# Patient Record
Sex: FEMALE | Race: AMERICAN INDIAN OR ALASKA NATIVE | NOT HISPANIC OR LATINO | Employment: FULL TIME | URBAN - METROPOLITAN AREA
[De-identification: names, ages, dates, MRNs, and addresses within clinical notes are randomized per-mention and may not be internally consistent; named-entity substitution may affect disease eponyms.]

---

## 2019-03-08 ENCOUNTER — TELEPHONE (OUTPATIENT)
Dept: HEALTH INFORMATION MANAGEMENT | Facility: OTHER | Age: 54
End: 2019-03-08

## 2019-03-10 ENCOUNTER — OFFICE VISIT (OUTPATIENT)
Dept: URGENT CARE | Facility: PHYSICIAN GROUP | Age: 54
End: 2019-03-10
Payer: COMMERCIAL

## 2019-03-10 VITALS
HEIGHT: 63 IN | WEIGHT: 193 LBS | RESPIRATION RATE: 16 BRPM | BODY MASS INDEX: 34.2 KG/M2 | TEMPERATURE: 98.4 F | DIASTOLIC BLOOD PRESSURE: 88 MMHG | HEART RATE: 86 BPM | SYSTOLIC BLOOD PRESSURE: 134 MMHG | OXYGEN SATURATION: 98 %

## 2019-03-10 DIAGNOSIS — L24.9 IRRITANT CONTACT DERMATITIS, UNSPECIFIED TRIGGER: ICD-10-CM

## 2019-03-10 PROCEDURE — 99214 OFFICE O/P EST MOD 30 MIN: CPT | Performed by: PHYSICIAN ASSISTANT

## 2019-03-10 RX ORDER — TRIAMCINOLONE ACETONIDE 1 MG/G
OINTMENT TOPICAL
Qty: 30 G | Refills: 0 | Status: SHIPPED | OUTPATIENT
Start: 2019-03-10 | End: 2020-07-31

## 2019-03-13 ASSESSMENT — ENCOUNTER SYMPTOMS
NEUROLOGICAL NEGATIVE: 1
EYES NEGATIVE: 1
CONSTITUTIONAL NEGATIVE: 1
GASTROINTESTINAL NEGATIVE: 1

## 2019-03-13 NOTE — PROGRESS NOTES
Subjective:      Britany Marsh is a 53 y.o. female who presents with Rash (Dry cracked skin under nose ongoing several weeks.)        Rash     Patient presents today for approx 3 weeks of irritation around nose and eyes.  Patient states she has hx of very dry skin. She started getting a cracked area around her left nare and went to [facility] where she was dx with shingles.  She states she ended up not picking up the Valtrex but did  some Gabapentin which she took for the stinging area by her nose with mild relief.  She notes she had been using Aquaphor with modest relief of the redness and then also got a silver based cream that seemed to make her rash react poorly and now is more irritated an inflamed.  It is slightly itchy and has not noticed any crusting or drainage.  No fevers.  No hx of eczema.  Skin seems to get much worse after the silver based cream.      Review of Systems   Constitutional: Negative.    HENT: Negative.    Eyes: Negative.    Gastrointestinal: Negative.    Skin: Positive for itching and rash.   Neurological: Negative.    Endo/Heme/Allergies: Negative.        PMH:  has a past medical history of Hypertension.  MEDS:   Current Outpatient Prescriptions:   •  triamcinolone acetonide (KENALOG) 0.1 % Ointment, Apply thin layer to affected areas BID x 7 days, Disp: 30 g, Rfl: 0  •  atenolol (TENORMIN) 25 MG TABS, Take 25 mg by mouth as needed., Disp: , Rfl:   •  Cholecalciferol 2000 UNIT CAPS, Take 2,000 Units by mouth every day., Disp: , Rfl:   •  GLUCOSAMINE HCL PO, Take 1 Tab by mouth every day., Disp: , Rfl:   •  simvastatin (ZOCOR) 40 MG TABS, Take 40 mg by mouth every day., Disp: , Rfl:   •  aspirin 81 MG tablet, Take 81 mg by mouth every day., Disp: , Rfl:   •  FISH OIL, Take 1 Cap by mouth every day., Disp: , Rfl:   •  LOVASTATIN PO, Take 1 Tab by mouth every day., Disp: , Rfl:   ALLERGIES:   Allergies   Allergen Reactions   • Pcn [Penicillins]      SURGHX: No past surgical  "history on file.  SOCHX:  reports that she has been smoking Cigarettes.  She does not have any smokeless tobacco history on file. She reports that she drinks alcohol. She reports that she does not use drugs.  FH: Family history was reviewed, no pertinent findings to report   Objective:     /88   Pulse 86   Temp 36.9 °C (98.4 °F) (Temporal)   Resp 16   Ht 1.6 m (5' 3\")   Wt 87.5 kg (193 lb)   SpO2 98%   BMI 34.19 kg/m²      Physical Exam   Constitutional: She is oriented to person, place, and time. She appears well-developed and well-nourished. No distress.   HENT:   Head: Normocephalic.       Right Ear: External ear normal.   Left Ear: External ear normal.   Mouth/Throat: Oropharynx is clear and moist. No oropharyngeal exudate.   Eyes: Pupils are equal, round, and reactive to light. Conjunctivae and EOM are normal. Right eye exhibits no discharge. Left eye exhibits no discharge.   Neck: Normal range of motion. Neck supple.   Cardiovascular: Normal rate and regular rhythm.    Pulmonary/Chest: Effort normal and breath sounds normal.   Musculoskeletal: Normal range of motion.   Lymphadenopathy:     She has no cervical adenopathy.   Neurological: She is alert and oriented to person, place, and time.   Vitals reviewed.          Assessment/Plan:     1. Irritant contact dermatitis, unspecified trigger  triamcinolone acetonide (KENALOG) 0.1 % Ointment    REFERRAL TO DERMATOLOGY       -possible irritant dermatitis now with application of silver based cream as this acutely worsened this symptoms per patient.    -will place on topical steroid ointment to be applied in thin layer, carefully and up to a 1 week.  She may continue to moisturize.  Discussed Aquaphor does have lanolin however she does not report a known wool allergy.   -Derm follow up referral placed      Supportive care, differential diagnoses, and indications for immediate follow-up discussed with patient.   Pathogenesis of diagnosis discussed " including typical length and natural progression.   Instructed to return to clinic or nearest emergency department for any change in condition, further concerns, or worsening of symptoms.  Patient states understanding of the plan of care and discharge instructions.    Louisa Peres P.A.-C.

## 2019-04-11 ENCOUNTER — OFFICE VISIT (OUTPATIENT)
Dept: INTERNAL MEDICINE | Facility: MEDICAL CENTER | Age: 54
End: 2019-04-11
Payer: COMMERCIAL

## 2019-04-11 VITALS
TEMPERATURE: 98.1 F | BODY MASS INDEX: 34.4 KG/M2 | HEART RATE: 84 BPM | SYSTOLIC BLOOD PRESSURE: 122 MMHG | WEIGHT: 194.13 LBS | DIASTOLIC BLOOD PRESSURE: 80 MMHG | HEIGHT: 63 IN | OXYGEN SATURATION: 93 %

## 2019-04-11 DIAGNOSIS — Z88.9 MULTIPLE ALLERGIES: ICD-10-CM

## 2019-04-11 DIAGNOSIS — Z12.39 BREAST CANCER SCREENING: ICD-10-CM

## 2019-04-11 DIAGNOSIS — R21 RASH: ICD-10-CM

## 2019-04-11 DIAGNOSIS — E66.9 OBESITY (BMI 30-39.9): ICD-10-CM

## 2019-04-11 DIAGNOSIS — Z12.11 COLON CANCER SCREENING: ICD-10-CM

## 2019-04-11 DIAGNOSIS — E78.5 DYSLIPIDEMIA: ICD-10-CM

## 2019-04-11 PROCEDURE — 99204 OFFICE O/P NEW MOD 45 MIN: CPT | Mod: GC | Performed by: INTERNAL MEDICINE

## 2019-04-11 RX ORDER — MINERAL OIL/HYDROPHIL PETROLAT
OINTMENT (GRAM) TOPICAL 2 TIMES DAILY
COMMUNITY
End: 2020-04-30

## 2019-04-11 RX ORDER — DOXYCYCLINE HYCLATE 100 MG
100 TABLET ORAL 2 TIMES DAILY
Qty: 28 TAB | Refills: 0 | Status: SHIPPED | OUTPATIENT
Start: 2019-04-11 | End: 2020-04-30

## 2019-04-11 ASSESSMENT — PATIENT HEALTH QUESTIONNAIRE - PHQ9: CLINICAL INTERPRETATION OF PHQ2 SCORE: 0

## 2019-04-11 NOTE — PROGRESS NOTES
New Patient to Establish    Reason to establish: New patient to establish    CC:   Chief Complaint   Patient presents with   • New Patient   • Rash     Pt had a rash in front of face under nose area. Follow up   • Eye Problem     Pt's eyes gets dried corner of eyes and gets bumps   • Skin Discoloration     [place] under right breast. Pt noticed [id number]     HPI:   The patient is a 53-year-old female with past medical history of hypertension, dyslipidemia presented to the clinic to establish primary care and with complains of Rash since the past three weeks.  -She stated she has noticed two circular rashes located on the right side of her chest that is not painful, not progressively increasing in size.  Never had similar rashes in the past.  -No recent travel history or sick contacts.  - She has recently taken her dog to vet who has removed a tick.   - she admits to smoking cigarettes intermittently over the past 40 years, denies illicit drug use.  Drinks alcohol occasionally.  -She had a rash under the nose a month ago, went to urgent care was prescribed steroid ointment and symptoms resolved.    Patient Active Problem List    Diagnosis Date Noted   • Obesity (BMI 30-39.9) 04/11/2019       Past Medical History:   Diagnosis Date   • Hypertension      Current Outpatient Prescriptions   Medication Sig Dispense Refill   • hydrophilic ointment (AQUAPHOR) Ointment Apply  to affected area(s) 2 Times a Day.     • Multiple Vitamins-Minerals (MULTIVITAMIN PO) Take 1 Tab by mouth every day.     • Biotin 5000 MCG Cap Take 1 Cap by mouth every day.     • NON SPECIFIED Instaflex. Order a sample. Pt started on 04/01/19     • doxycycline (VIBRAMYCIN) 100 MG Tab Take 1 Tab by mouth 2 times a day. 28 Tab 0   • triamcinolone acetonide (KENALOG) 0.1 % Ointment Apply thin layer to affected areas BID x 7 days 30 g 0   • simvastatin (ZOCOR) 40 MG TABS Take 40 mg by mouth every day.     • aspirin 81 MG tablet Take 81 mg by mouth  every day.     • FISH OIL Take 1,000 mg by mouth every day.     • atenolol (TENORMIN) 25 MG TABS Take 25 mg by mouth as needed.     • Cholecalciferol 2000 UNIT CAPS Take 2,000 Units by mouth every day.     • GLUCOSAMINE HCL PO Take 1 Tab by mouth every day.     • LOVASTATIN PO Take 1 Tab by mouth every day.       No current facility-administered medications for this visit.      Allergies as of 04/11/2019 - Reviewed 04/11/2019   Allergen Reaction Noted   • Pcn [penicillins]  02/04/2014     Social History     Social History   • Marital status: Single     Spouse name: N/A   • Number of children: N/A   • Years of education: N/A     Occupational History   • Not on file.     Social History Main Topics   • Smoking status: Current Some Day Smoker     Years: 40.00     Types: Cigarettes   • Smokeless tobacco: Never Used      Comment: a Pack last 2 months   • Alcohol use Yes      Comment: occ / weekends   • Drug use: No   • Sexual activity: Not on file     Other Topics Concern   • Not on file     Social History Narrative   • No narrative on file     Family History   Problem Relation Age of Onset   • Colon Cancer Mother    • Colon Cancer Father         at age 70's   • Heart Disease Father    • Cancer Sister         Thyroid cancer at age 60's   • Diabetes Brother    • Breast Cancer Sister         s/p Mastectomy at age 60's    • No Known Problems Brother      History reviewed. No pertinent surgical history.    ROS: As per HPI. Additional pertinent systems as noted below.  Constitutional: Negative for chills and fever.   HENT: Negative for ear pain and sore throat.    Eyes: Negative for discharge and redness.   Respiratory: Negative for cough, hemoptysis, wheezing and stridor.    Cardiovascular: Negative for chest pain, palpitations and leg swelling.   Gastrointestinal: Negative for abdominal pain, constipation, diarrhea, heartburn, nausea and vomiting.   Genitourinary: Negative for dysuria, flank pain and hematuria.  "  Musculoskeletal: Negative for falls and myalgias.   Skin: Negative for itching and rash.   Neurological: Negative for dizziness, seizures, loss of consciousness and headaches.   Endo/Heme/Allergies: Negative for polydipsia. Does not bruise/bleed easily.   Psychiatric/Behavioral: Negative for substance abuse and suicidal ideas.     /80 (BP Location: Left arm, Patient Position: Sitting, BP Cuff Size: Adult)   Pulse 84   Temp 36.7 °C (98.1 °F) (Temporal)   Ht 1.594 m (5' 2.75\")   Wt 88.1 kg (194 lb 2 oz)   SpO2 93%   BMI 34.66 kg/m²     Physical Exam  General:  Alert and oriented, No apparent distress.    Eyes: Pupils equal and reactive. No scleral icterus.    Throat: Clear no erythema or exudates noted.    Neck: Supple. No lymphadenopathy noted. Thyroid not enlarged.    Lungs: Clear to auscultation and percussion bilaterally.    Cardiovascular: Regular rate and rhythm. No murmurs, rubs or gallops.    Abdomen:  Benign. No rebound or guarding noted.    Extremities: No clubbing, cyanosis, edema.    Skin: there are two distinct clearly demarcated central clearing rash- 3 cm size located in the right upper quadrant of the abdomen , one small 2 cm rash under the right breast that is non tender with central clearing.     Note: I have reviewed all pertinent labs and diagnostic tests associated with this visit with specific comments listed under the assessment and plan below    Assessment and Plan    1. Dyslipidemia  - No recent lipid panel on file.  - Currently on simvastatin daily for primary prevention.  -Ordered lipid panel.    2. Obesity (BMI 30-39.9)  Body mass index is 34.66 kg/m².  -Patient is identified to have weight management issues patient is currently on dietary modifications to lose weight and has lost more than 10 pounds since the past couple of months.  Patient is encouraged regarding weight loss and exercise.    3. Multiple allergies  -History of multiple allergies, specific allergy to " shellfish.  -Patient is advised to use over-the-counter antihistamines as needed for allergies.    4. Rash  -Patient reports she noticed a rash since the past 3 weeks which is not increasing in size.  - on exam there are two distinct circular rashes with central clearing and reddish margins located over the right breast in the lower quadrant and one over the right upper quadrant of abdomen.  -High likely patient has Lyme rash erythema migrans.  -Prescribed doxycycline 100 mg twice daily for 14 days.    5. Breast cancer screening  -Mammogram is ordered for breast cancer screening last mammogram was in 2015 which showed fiber dense breast.    6. Colon cancer screening  -Patient is referred to GI for colonoscopy      Followup: Return in about 3 months (around 7/11/2019).      Signed by: Zuly Langley M.D.

## 2019-04-11 NOTE — PATIENT INSTRUCTIONS
Lyme Disease  Lyme disease is an infection that affects many parts of the body, including the skin, joints, and nervous system. It is a bacterial infection that starts from the bite of an infected tick. The infection can spread, and some of the symptoms are similar to the flu. If Lyme disease is not treated, it may cause joint pain, swelling, numbness, problems thinking, fatigue, muscle weakness, and other problems.  What are the causes?  This condition is caused by bacteria called Borrelia burgdorferi.  You can get Lyme disease by being bitten by an infected tick. The tick must be attached to your skin to pass along the infection. [place] often carry infected ticks.  What increases the risk?  The following factors may make you more likely to develop this condition:  · Living in or visiting these areas in the [place]  ¨ [place].  ¨ [place] [facility] states.  ¨ [place].  · Spending time in wooded or grassy areas.  · Being outdoors with exposed skin.  · Camping, gardening, hiking, fishing, or hunting outdoors.  · Failing to remove a tick from your skin within 3-4 days.  What are the signs or symptoms?  Symptoms of this condition include:  · A round, red rash that surrounds the center of the tick bite. This is the first sign of infection. The center of the rash may be blood colored or have tiny blisters.  · Fatigue.  · Headache.  · Chills and fever.  · General achiness.  · Joint pain, often in the knees.  · Muscle pain.  · Swollen lymph glands.  · Stiff neck.  How is this diagnosed?  This condition is diagnosed based on:  · Your symptoms and medical history.  · A physical exam.  · A blood test.  How is this treated?  The main treatment for this condition is antibiotic medicine, which is usually taken by mouth (orally). The length of treatment depends on how soon after a tick bite you begin taking the medicine. In some cases, treatment is necessary for several weeks. If the infection is severe, antibiotics may  need to be given through an IV tube that is inserted into one of your veins.  Follow these instructions at home:  · Take your antibiotic medicine as told by your health care provider. Do not stop taking the antibiotic even if you start to feel better.  · Ask your health care provider about taking a probiotic in between doses of your antibiotic to help avoid stomach upset or diarrhea.  · Check with your health care provider before supplementing your treatment. Many alternative therapies have not been proven and may be harmful to you.  · Keep all follow-up visits as told by your health care provider. This is important.  How is this prevented?  You can become reinfected if you get another tick bite from an infected tick. Take these steps to help prevent an infection:  · Cover your skin with light-colored clothing when you are outdoors in the spring and summer months.  · Spray clothing and skin with bug spray. The spray should be 20-30% DEET.  · Avoid wooded, grassy, and shaded areas.  · Remove yard litter, brush, trash, and plants that attract deer and rodents.  · Check yourself for ticks when you come indoors.  · Wash clothing worn each day.  · Check your pets for ticks before they come inside.  · If you find a tick:  ¨ Remove it with tweezers.  ¨ Clean your hands and the bite area with rubbing alcohol or soap and water.  Pregnant women should take special care to avoid tick bites because the infection can be passed along to the fetus.  Contact a health care provider if:  · You have symptoms after treatment.  · You have removed a tick and want to bring it to your health care provider for testing.  Get help right away if:  · You have an irregular heartbeat.  · You have nerve pain.  · Your face feels numb.  This information is not intended to replace advice given to you by your health care provider. Make sure you discuss any questions you have with your health care provider.  Document Released: 03/26/2002 Document  Revised: 08/08/2017 Document Reviewed: 08/08/2017  [employer] Interactive Patient Education © 2017 [employer]

## 2019-04-19 ENCOUNTER — APPOINTMENT (OUTPATIENT)
Dept: MEDICAL GROUP | Facility: PHYSICIAN GROUP | Age: 54
End: 2019-04-19
Payer: COMMERCIAL

## 2019-04-26 ENCOUNTER — HOSPITAL ENCOUNTER (OUTPATIENT)
Dept: RADIOLOGY | Facility: MEDICAL CENTER | Age: 54
End: 2019-04-26
Attending: STUDENT IN AN ORGANIZED HEALTH CARE EDUCATION/TRAINING PROGRAM
Payer: COMMERCIAL

## 2019-04-26 DIAGNOSIS — Z12.39 BREAST CANCER SCREENING: ICD-10-CM

## 2019-04-26 PROCEDURE — 77063 BREAST TOMOSYNTHESIS BI: CPT

## 2019-06-12 LAB
BASOPHILS # BLD AUTO: 0 X10E3/UL (ref 0–0.2)
BASOPHILS NFR BLD AUTO: 1 %
CHOLEST SERPL-MCNC: 173 MG/DL (ref 100–199)
EOSINOPHIL # BLD AUTO: 0.1 X10E3/UL (ref 0–0.4)
EOSINOPHIL NFR BLD AUTO: 3 %
ERYTHROCYTE [DISTWIDTH] IN BLOOD BY AUTOMATED COUNT: 14.6 % (ref 12.3–15.4)
HCT VFR BLD AUTO: 43.5 % (ref 34–46.6)
HDLC SERPL-MCNC: 67 MG/DL
HGB BLD-MCNC: 14.3 G/DL (ref 11.1–15.9)
IMM GRANULOCYTES # BLD AUTO: 0 X10E3/UL (ref 0–0.1)
IMM GRANULOCYTES NFR BLD AUTO: 0 %
IMMATURE CELLS  115398: NORMAL
LABORATORY COMMENT REPORT: NORMAL
LDLC SERPL CALC-MCNC: 77 MG/DL (ref 0–99)
LYMPHOCYTES # BLD AUTO: 1.7 X10E3/UL (ref 0.7–3.1)
LYMPHOCYTES NFR BLD AUTO: 41 %
MCH RBC QN AUTO: 30.3 PG (ref 26.6–33)
MCHC RBC AUTO-ENTMCNC: 32.9 G/DL (ref 31.5–35.7)
MCV RBC AUTO: 92 FL (ref 79–97)
MONOCYTES # BLD AUTO: 0.2 X10E3/UL (ref 0.1–0.9)
MONOCYTES NFR BLD AUTO: 4 %
MORPHOLOGY BLD-IMP: NORMAL
NEUTROPHILS # BLD AUTO: 2.1 X10E3/UL (ref 1.4–7)
NEUTROPHILS NFR BLD AUTO: 51 %
NRBC BLD AUTO-RTO: NORMAL %
PLATELET # BLD AUTO: 243 X10E3/UL (ref 150–450)
RBC # BLD AUTO: 4.72 X10E6/UL (ref 3.77–5.28)
TRIGL SERPL-MCNC: 144 MG/DL (ref 0–149)
VLDLC SERPL CALC-MCNC: 29 MG/DL (ref 5–40)
WBC # BLD AUTO: 4.2 X10E3/UL (ref 3.4–10.8)

## 2020-04-30 ENCOUNTER — OFFICE VISIT (OUTPATIENT)
Dept: MEDICAL GROUP | Facility: MEDICAL CENTER | Age: 55
End: 2020-04-30
Payer: COMMERCIAL

## 2020-04-30 VITALS
BODY MASS INDEX: 33.98 KG/M2 | OXYGEN SATURATION: 98 % | RESPIRATION RATE: 16 BRPM | HEIGHT: 63 IN | SYSTOLIC BLOOD PRESSURE: 128 MMHG | WEIGHT: 191.8 LBS | HEART RATE: 77 BPM | DIASTOLIC BLOOD PRESSURE: 72 MMHG | TEMPERATURE: 98 F

## 2020-04-30 DIAGNOSIS — M25.561 CHRONIC PAIN OF BOTH KNEES: ICD-10-CM

## 2020-04-30 DIAGNOSIS — E78.5 DYSLIPIDEMIA: ICD-10-CM

## 2020-04-30 DIAGNOSIS — E66.9 OBESITY (BMI 30.0-34.9): ICD-10-CM

## 2020-04-30 DIAGNOSIS — M25.562 CHRONIC PAIN OF BOTH KNEES: ICD-10-CM

## 2020-04-30 DIAGNOSIS — Z11.4 SCREENING FOR HIV (HUMAN IMMUNODEFICIENCY VIRUS): ICD-10-CM

## 2020-04-30 DIAGNOSIS — E55.9 VITAMIN D INSUFFICIENCY: ICD-10-CM

## 2020-04-30 DIAGNOSIS — Z12.31 ENCOUNTER FOR SCREENING MAMMOGRAM FOR BREAST CANCER: ICD-10-CM

## 2020-04-30 DIAGNOSIS — M25.561 ACUTE PAIN OF RIGHT KNEE: ICD-10-CM

## 2020-04-30 DIAGNOSIS — Z13.1 DIABETES MELLITUS SCREENING: ICD-10-CM

## 2020-04-30 DIAGNOSIS — Z72.0 TOBACCO USE: ICD-10-CM

## 2020-04-30 DIAGNOSIS — G89.29 CHRONIC PAIN OF BOTH KNEES: ICD-10-CM

## 2020-04-30 DIAGNOSIS — Z11.59 NEED FOR HEPATITIS C SCREENING TEST: ICD-10-CM

## 2020-04-30 DIAGNOSIS — Z23 NEED FOR VACCINATION: ICD-10-CM

## 2020-04-30 PROCEDURE — 90732 PPSV23 VACC 2 YRS+ SUBQ/IM: CPT | Performed by: INTERNAL MEDICINE

## 2020-04-30 PROCEDURE — 90471 IMMUNIZATION ADMIN: CPT | Performed by: INTERNAL MEDICINE

## 2020-04-30 PROCEDURE — 99215 OFFICE O/P EST HI 40 MIN: CPT | Mod: 25 | Performed by: INTERNAL MEDICINE

## 2020-04-30 RX ORDER — CYCLOBENZAPRINE HCL 10 MG
10 TABLET ORAL 3 TIMES DAILY PRN
COMMUNITY
End: 2020-07-31

## 2020-04-30 RX ORDER — NAPROXEN 500 MG/1
550 TABLET ORAL 2 TIMES DAILY WITH MEALS
COMMUNITY
End: 2021-12-09

## 2020-04-30 ASSESSMENT — PATIENT HEALTH QUESTIONNAIRE - PHQ9: CLINICAL INTERPRETATION OF PHQ2 SCORE: 0

## 2020-04-30 NOTE — LETTER
MRN:[id number]                      After Visit Summary   3/24/2017    Codi Headley    MRN: [id number]           Visit Information        Provider Department      3/24/2017 10:30 AM Jayesh Cooper, [facility]      Your next 10 appointments already scheduled     Mar 31, 2017 10:00 AM CDT   Return Visit with Aguilar Eng MD   [facility] ([facility])    [address] [place] [id number] [phone]            Apr 05, 2017  9:30 AM CDT   Return Visit with CELESTE Vega   [facility]    [address] [place] [id number] [phone]              MyChart Information     [employer] gives you secure access to your electronic health record. If you see a primary care provider, you can also send messages to your care team and make appointments. If you have questions, please call your primary care clinic.  If you do not have a primary care provider, please call [phone] and they will assist you.        Care EveryWhere ID     This is your Care EveryWhere ID. This could be used by other organizations to access your [place] medical records  [id number]         [employer]  Aamir Power M.D.  [address]  [place] [address]  Fax: [phone]   Authorization for Release/Disclosure of   Protected Health Information   Name: SHAYLA GREENWOOD : 1965 SSN: [id number]   Address: [address] [place] [address]  [place] [address] Phone:    [phone] (home)    I authorize the entity listed below to release/disclose the PHI below to:   [employer]/Aamir Power M.D. and Aamir Power M.D.   Provider or Entity Name:     Address   City, State, Zip   Phone:      Fax:     Reason for request: continuity of care   Information to be released:    [  ] LAST COLONOSCOPY,  including any PATH REPORT and follow-up  [  ] LAST FIT/COLOGUARD RESULT [  ] LAST DEXA  [  ] LAST MAMMOGRAM  [  ] LAST PAP  [  ] LAST LABS [  ] RETINA EXAM REPORT  [  ] IMMUNIZATION RECORDS  [xxx ] Release all info      [  ] Check here and initial the line next to each item to release ALL health information INCLUDING  _____ Care and treatment for drug and / or alcohol abuse  _____ HIV testing, infection status, or AIDS  _____ Genetic Testing    DATES OF SERVICE OR TIME PERIOD TO BE DISCLOSED: _____________  I understand and acknowledge that:  * This Authorization may be revoked at any time by you in writing, except if your health information has already been used or disclosed.  * Your health information that will be used or disclosed as a result of you signing this authorization could be re-disclosed by the recipient. If this occurs, your re-disclosed health information may no longer be protected by State or Federal laws.  * You may refuse to sign this Authorization. Your refusal will not affect your ability to obtain treatment.  * This Authorization becomes effective upon signing and will  on (date) __________.      If no date is indicated, this Authorization will  one (1) year from the signature date.    Name: Shayla Greenwood    Signature:   Date:     2020          PLEASE FAX REQUESTED RECORDS BACK TO: [phone]
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
## 2020-04-30 NOTE — ASSESSMENT & PLAN NOTE
At work, she going down the stairs and turn around to close the door, and then twisted the right knee. She went [facility], was told right knee pretty bad and going to do PT, now taking naproxen and flexeril prn, was told need referral to specialist.
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
## 2020-06-11 ENCOUNTER — HOSPITAL ENCOUNTER (OUTPATIENT)
Dept: RADIOLOGY | Facility: MEDICAL CENTER | Age: 55
End: 2020-06-11
Attending: INTERNAL MEDICINE
Payer: COMMERCIAL

## 2020-06-11 DIAGNOSIS — Z12.31 ENCOUNTER FOR SCREENING MAMMOGRAM FOR BREAST CANCER: ICD-10-CM

## 2020-06-11 PROCEDURE — 77067 SCR MAMMO BI INCL CAD: CPT

## 2020-06-12 ENCOUNTER — TELEPHONE (OUTPATIENT)
Dept: MEDICAL GROUP | Facility: MEDICAL CENTER | Age: 55
End: 2020-06-12

## 2020-06-12 NOTE — TELEPHONE ENCOUNTER
Phone Number Called: [phone] (home)       Call outcome: Did not leave a detailed message. Requested patient to call back.    Message: Left VM requesting a return phone call. Attempting to schedule an appointment in 2 weeks to go over lab results.

## 2020-06-12 NOTE — TELEPHONE ENCOUNTER
----- Message from Aamir Power M.D. sent at 6/12/2020  9:13 AM PDT -----  Please call patient and schedule an appointment with Dr. Power in 2 weeks to review lab.    Thanks!

## 2020-07-06 ENCOUNTER — TELEPHONE (OUTPATIENT)
Dept: MEDICAL GROUP | Facility: MEDICAL CENTER | Age: 55
End: 2020-07-06

## 2020-07-06 NOTE — TELEPHONE ENCOUNTER
----- Message from Aamir Power M.D. sent at 7/5/2020  9:08 PM PDT -----  Please call patient and schedule a virtual appointment with Dr. Power in 1-2 weeks to review lab.    Thanks!

## 2020-07-06 NOTE — TELEPHONE ENCOUNTER
Phone Number Called: [phone] (home)       Call outcome: Did not leave a detailed message. Requested patient to call back.    Message: Left VM requesting return phone call. Attempting to schedule an appointment in the next two weeks to go over lab results.

## 2020-07-31 ENCOUNTER — OFFICE VISIT (OUTPATIENT)
Dept: MEDICAL GROUP | Facility: MEDICAL CENTER | Age: 55
End: 2020-07-31
Payer: COMMERCIAL

## 2020-07-31 VITALS
HEIGHT: 63 IN | BODY MASS INDEX: 34.53 KG/M2 | OXYGEN SATURATION: 92 % | DIASTOLIC BLOOD PRESSURE: 76 MMHG | WEIGHT: 194.89 LBS | SYSTOLIC BLOOD PRESSURE: 120 MMHG | TEMPERATURE: 98.2 F | RESPIRATION RATE: 16 BRPM | HEART RATE: 72 BPM

## 2020-07-31 DIAGNOSIS — E78.5 DYSLIPIDEMIA: ICD-10-CM

## 2020-07-31 DIAGNOSIS — E55.9 VITAMIN D INSUFFICIENCY: ICD-10-CM

## 2020-07-31 DIAGNOSIS — E66.9 OBESITY (BMI 30.0-34.9): ICD-10-CM

## 2020-07-31 PROCEDURE — 99213 OFFICE O/P EST LOW 20 MIN: CPT | Performed by: INTERNAL MEDICINE

## 2020-07-31 RX ORDER — SIMVASTATIN 40 MG
TABLET ORAL
COMMUNITY
Start: 2020-05-11

## 2020-07-31 RX ORDER — CHLORAL HYDRATE 500 MG
CAPSULE ORAL
COMMUNITY
End: 2021-12-09

## 2020-07-31 NOTE — PROGRESS NOTES
"Established Patient    Britany Marsh is a 54 y.o. female who presents today with the following:    CC:   Chief Complaint   Patient presents with   • Follow-Up     Lab Results, cholesterol       HPI:     Dyslipidemia  On statin      Vitamin D insufficiency  Controlled. Taking Vit D3 2000 IU daily      Obesity (BMI 30.0-34.9)  Body mass index is 34.52 kg/m².    Lifestyle modifications          Current Outpatient Medications   Medication Sig Dispense Refill   • simvastatin (ZOCOR) 40 MG Tab      • naproxen (NAPROSYN) 500 MG Tab Take 550 mg by mouth 2 times a day, with meals.     • Multiple Vitamins-Minerals (MULTIVITAMIN PO) Take 1 Tab by mouth every day.     • Cholecalciferol 2000 UNIT CAPS Take 2,000 Units by mouth every day.     • aspirin 81 MG tablet Take 81 mg by mouth every day.     • FISH OIL Take 1,000 mg by mouth every day.     • Omega-3 Fatty Acids (FISH OIL) 1000 MG Cap capsule FISH OIL     • GLUCOSAMINE HCL PO Take 1 Tab by mouth every day.       No current facility-administered medications for this visit.        Allergies, past medical history, past surgical history, medications, family history, social history reviewed and updated.    ROS   Constitutional: Denies fevers or chills  Eyes: Denies changes in vision  Ears/Nose/Throat/Mouth: Denies nasal congestion or sore throat   Cardiovascular: Denies chest pain or palpitations   Respiratory: Denies shortness of breath , Denies cough  Gastrointestinal/Hepatic: Denies abd pain, nausea, vomiting   Genitourinary: Denies dysuria or frequency  Musculoskeletal/Rheum: chronic knee pain  Neurological: Denies headache  Psychiatric: Denies mood disorder   Endocrine: Denies hx of diabetes or thyroid dysfunction  Heme/Oncology/Lymph Nodes: Denies weight changes or enlarged LNs.    Physical Exam  Vitals: /76 (BP Location: Left arm, Patient Position: Sitting, BP Cuff Size: Adult)   Pulse 72   Temp 36.8 °C (98.2 °F) (Temporal)   Resp 16   Ht 1.6 m (5' 3\")   " Wt 88.4 kg (194 lb 14.2 oz)   SpO2 92%   BMI 34.52 kg/m²   General: Alert, pleasant, NAD  HEENT: Normocephalic.  EOMI, no icterus or pallor.  Conjunctivae and lids normal. External ears normal. Oropharynx non-erythematous, mucous membranes moist.  Neck supple.  No thyromegaly or masses palpated.   Lymph: No cervical or supraclavicular lymphadenopathy.  Cardiovascular: Regular rate and rhythm.  S1 and S2 normal.  No murmurs appreciated.  Respiratory: Normal respiratory effort.  Clear to auscultation bilaterally.  Abdomen: Non-distended, soft  Skin: Warm, dry, no rashes.  Musculoskeletal: Gait is normal.  Moves all extremities well.  Extremities: No leg edema.  Pedal pulses 2+ symmetric.   Psych:  Affect/mood is normal, judgement is good, memory is intact, grooming is appropriate.      Labs (6/11/2019) were reviewed and discussed with patients.  Imaging (6/11/2019) was reviewed.  All questions were answered.      Assessment and Plan    1. Dyslipidemia  Continue simvastatin    2. Obesity (BMI 30.0-34.9)  Lifestyle modifications    3. Vitamin D insufficiency  Replacement    She follows with [facility] for her knee pain    Follow-up:Return if symptoms worsen or fail to improve.    This note was created using voice recognition software. There may be unintended errors in spelling, grammar or content.

## 2021-03-15 DIAGNOSIS — Z23 NEED FOR VACCINATION: ICD-10-CM

## 2021-11-29 PROBLEM — M17.9 OSTEOARTHRITIS, KNEE: Status: ACTIVE | Noted: 2021-11-29

## 2022-09-30 ENCOUNTER — TELEPHONE (OUTPATIENT)
Dept: MEDICAL GROUP | Facility: MEDICAL CENTER | Age: 57
End: 2022-09-30